# Patient Record
(demographics unavailable — no encounter records)

---

## 2024-10-08 NOTE — ASSESSMENT
[FreeTextEntry1] : Patient is a 47 yo M who presents for ED, BPH/LUTS. He has been on T supplementation, happy with his current regimen/doctor managing this.  He has regular labs and PSA testing with this other doctor.  He is here primarily for ED and to discuss LUTS.  We had a long discussion about his BPH/LUTS.  He is not interested in flomax and happy to stay on finasteride.  But he is open to MIST procedures, as he would be happy to decrease finasteride, still wants to take for his hair.  We reviewed SE profile of finasteride. D/w pt that MIST ie Urolift/Rezum has lowest risk of RE, but not zero.  D/w pt that would refer to Dr Garg if he is interested in Rezum/MIST as I do not perform. PVR today is 10cc Udip neg  In terms of ED, discussed with pt treatment options for ED, including oral PDE5-is, injectable medications such as MUSE or ICI, RINKU, and penile prosthesis. D/w pt LiSWT is considered experimental. He is open to cialis, he has tried in the past and would be open to trying again.  He has some nasal congestion w viagra, he recalls possible HA with cialis.  Reviewed w pt SE profile and risks including priapism D/w pt that may take more time for erections to recover since it could be postoperative and often takes months to improve. F/u w me for medication refills prn  Refer to Dr Garg for Rezum discussion and further evaluation of BPH

## 2024-10-08 NOTE — PHYSICAL EXAM
[General Appearance - Well Developed] : well developed [Normal Appearance] : normal appearance [Well Groomed] : well groomed [General Appearance - In No Acute Distress] : no acute distress [Edema] : no peripheral edema [Respiration, Rhythm And Depth] : normal respiratory rhythm and effort [Exaggerated Use Of Accessory Muscles For Inspiration] : no accessory muscle use [Abdomen Soft] : soft [Abdomen Tenderness] : non-tender [Urinary Bladder Findings] : the bladder was normal on palpation [Normal Station and Gait] : the gait and station were normal for the patient's age [] : no rash [No Focal Deficits] : no focal deficits [Oriented To Time, Place, And Person] : oriented to person, place, and time [Affect] : the affect was normal [Mood] : the mood was normal [Abdomen Hernia] : no hernia was discovered [Urethral Meatus] : meatus normal [Penis Abnormality] : normal circumcised penis [Scrotum] : the scrotum was normal [Testes Tenderness] : no tenderness of the testes [Testes Mass (___cm)] : there were no testicular masses [de-identified] : small anterior incision/dressing [Chaperone Present] : A chaperone was present in the examining room during all aspects of the physical examination [FreeTextEntry2] : Bacilio Atwood

## 2024-10-08 NOTE — PHYSICAL EXAM
[General Appearance - Well Developed] : well developed [Normal Appearance] : normal appearance [Well Groomed] : well groomed [General Appearance - In No Acute Distress] : no acute distress [Edema] : no peripheral edema [Respiration, Rhythm And Depth] : normal respiratory rhythm and effort [Exaggerated Use Of Accessory Muscles For Inspiration] : no accessory muscle use [Abdomen Soft] : soft [Abdomen Tenderness] : non-tender [Urinary Bladder Findings] : the bladder was normal on palpation [Normal Station and Gait] : the gait and station were normal for the patient's age [] : no rash [No Focal Deficits] : no focal deficits [Oriented To Time, Place, And Person] : oriented to person, place, and time [Affect] : the affect was normal [Mood] : the mood was normal [Abdomen Hernia] : no hernia was discovered [Urethral Meatus] : meatus normal [Penis Abnormality] : normal circumcised penis [Scrotum] : the scrotum was normal [Testes Tenderness] : no tenderness of the testes [Testes Mass (___cm)] : there were no testicular masses [de-identified] : small anterior incision/dressing [Chaperone Present] : A chaperone was present in the examining room during all aspects of the physical examination [FreeTextEntry2] : Bacilio Atwood

## 2024-10-08 NOTE — HISTORY OF PRESENT ILLNESS
[FreeTextEntry1] : Patient is a 47 yo M who presents for ED, BPH/LUTS.  He reports that he had spinal lumbar disc replacement L5-S1 surgery in 7/31/24, he had intraop catheter and postop after catheter removed he was able to void without much issue.  He did have more urgency immediately after surgery.  This has improved since.  He does take finasteride.  He has been taking finasteride for many years.  Initially for hair, then started 5mg for years.  He reports if he cuts down on finasteride or stops it then he will notice decline in LUTS.  He reports more urgency, incomplete emptying if he reduces finasteride or stops it. Denies weak stream or nocturia.  He does take prostate supplements including saw palmetto. He had tried flomax ~5 yrs ago and he had poor ejaculation after and stopped. He feels after his cystoscopy with Dr Hutchison he had issues voiding as well.  He reports that he does drink a lot of water in the mornings because he takes supplements and works out in the gym.  He drinks 40-50oz within a few hours in the morning.   He also has been on T supplementation for many years.  Managed by another doctor.  States he has PSA checked regularly 0.3 in the past year.  His main reason for presenting today is due to ED.  He reports prior to his spinal lumbar disc replacement L5-S1 surgery he had no issues with ED.  He reports strong erections.  He was taking L-arginine, citrulline, beetroot and other supplements.  He feels that rigidity is now 5/10.  He took 50 mg of viagra (from [facility]) - improved his erections but did not feel significant benefit from his regular supplements.  He has tried 6x/times, it does work but still not lasting as long as in the past.  He just feels different where at times he does not maintain or obtain as well as the past. Denies CP, nitrates.

## 2024-10-08 NOTE — HISTORY OF PRESENT ILLNESS
[FreeTextEntry1] : Patient is a 45 yo M who presents for ED, BPH/LUTS.  He reports that he had spinal lumbar disc replacement L5-S1 surgery in 7/31/24, he had intraop catheter and postop after catheter removed he was able to void without much issue.  He did have more urgency immediately after surgery.  This has improved since.  He does take finasteride.  He has been taking finasteride for many years.  Initially for hair, then started 5mg for years.  He reports if he cuts down on finasteride or stops it then he will notice decline in LUTS.  He reports more urgency, incomplete emptying if he reduces finasteride or stops it. Denies weak stream or nocturia.  He does take prostate supplements including saw palmetto. He had tried flomax ~5 yrs ago and he had poor ejaculation after and stopped. He feels after his cystoscopy with Dr Hutchison he had issues voiding as well.  He reports that he does drink a lot of water in the mornings because he takes supplements and works out in the gym.  He drinks 40-50oz within a few hours in the morning.   He also has been on T supplementation for many years.  Managed by another doctor.  States he has PSA checked regularly 0.3 in the past year.  His main reason for presenting today is due to ED.  He reports prior to his spinal lumbar disc replacement L5-S1 surgery he had no issues with ED.  He reports strong erections.  He was taking L-arginine, citrulline, beetroot and other supplements.  He feels that rigidity is now 5/10.  He took 50 mg of viagra (from [facility]) - improved his erections but did not feel significant benefit from his regular supplements.  He has tried 6x/times, it does work but still not lasting as long as in the past.  He just feels different where at times he does not maintain or obtain as well as the past. Denies CP, nitrates.

## 2024-10-21 NOTE — HISTORY OF PRESENT ILLNESS
[FreeTextEntry1] : 46-year-old male who presents as a follow-up for BPH  Main symptoms include urinary frequency, sensation of incomplete emptying.  He is on finasteride which he believe is helping.  Denies weak stream or nocturia.  Previously tried tamsulosin which caused poor ejaculation. No gross hematuria.   Cystoscopy 2019: Bilobar enlargement, obstructive bladder  Drinks mostly water. Has cut caffeine recently but used to drink one hot latte / day. Social EtOH.   He is on TRT which he gets from a friend from [place]  PVR today: 59 mL

## 2024-10-21 NOTE — ASSESSMENT
[FreeTextEntry1] : 46 y.o. M with bothersome urinary symptoms - daytime frequency, sensation of incomplete emptying - Last PSA 0.31 per patient - PVR 59 mL - RBUS to assess size of prostate, also 3mm LLP stone reported on prior abdominal US - c/w finasteride - UA, UCx - Discussed unclear if prostate is source or symptoms, versus overactive bladder, pelvic floor / dysfunctioning voiding, etc. Did recommended UDS to fully evaluate.  He reports significant discomfort with any form of catheterization, and he reports this effects his ejaculation.  He had a catheter after recent spine surgery for 1 day and reports that this "messed him up".  Did discuss that with Christal, for which he was referred, he would need a catheter for 5 days.  He is not interested in this.  He will get the ultrasound and I will call with the results [Recent Weight Loss (___ Lbs)] : recent [unfilled] ~Ulb weight loss [Negative] : Heme/Lymph

## 2024-11-06 NOTE — HISTORY OF PRESENT ILLNESS
[FreeTextEntry1] : Mr. Galeano is a 46-year-old man presenting with concerns about anxiety, dizziness, and obsessive thoughts.  Mr. Galeano reports that his symptoms started after seeing a doctor for dizziness in September 2024. Prior to this appointment he had experienced feelings of motion sickness and nausea. These symptoms were intermittent and not particularly bothersome. During this appointment, the doctor performed a maneuver, which may have provoked his symptoms. He also told him he may be experiencing PPPD and anxiety. Since then, he has been experiencing significant anxiety, panic attacks, and an obsessive focus on motion and movement. He feels that his mind has become hyperaware of various bodily sensations, leading to discomfort and distress. Mr. Galeano has a history of obsessive thoughts in the past.. He also experienced panic attacks related to tinnitus in 2010 and 2014, which he was able to manage through therapy and medication. However, the current situation has been particularly challenging and overwhelming for him.

## 2024-11-06 NOTE — END OF VISIT
Medicare Wellness Visit  Plan for Preventive Care    A good way for you to stay healthy is to use preventive care.  Medicare covers many services that can help you stay healthy.* The goal of these services is to find any health problems as quickly as possible. Finding problems early can help make them easier to treat.  Your personal plan below lists the services you may need and when they are due.     Health Maintenance Summary     Topic Due On Due Status Completed On Postpone Until Reason    Colorectal Cancer Screening - Colonoscopy Nov 20, 2017 Not Due Nov 20, 2007      Immunization-Zoster  Completed Dec 5, 2014      Immunization - Pneumococcal  Completed Jul 27, 2015      Abdominal Aortic Aneurysm (AAA) Screening   Completed Sep 3, 2015      Medicare Wellness Visit Sep 18, 2018 Not Due Sep 18, 2017      IMMUNIZATION - DTaP/Tdap/Td Jul 10, 2004 Postponed Jul 9, 2004 Sep 18, 2017 Insurance or Financial    Immunization-Influenza Sep 1, 2017 Due On              Preventive Care for Women and Men    Heart Screenings (Cardiovascular):  · Blood tests are used to check your cholesterol, lipid and triglyceride levels. High levels can increase your risk for heart disease and stroke. High levels can be treated with medications, diet and exercise. Lowering your levels can help keep your heart and blood vessels healthy.  Your provider will order these tests if they are needed.    · An ultrasound is done to see if you have an abdominal aortic aneurysm (AAA).  This is an enlargement of one of the main blood vessels that delivers blood to the body.   In the [place], 9,000 deaths are caused by AAA.  You may not even know you have this problem and as many as 1 in 3 people will have a serious problem if it is not treated.  Early diagnosis allows for more effective treatment and cure.  If you have a family history of AAA or are a male age 65-75 who has smoked, you are at higher risk of an AAA.  Your provider can order this  [Time Spent: ___ minutes] : I have spent [unfilled] minutes of time on the encounter which excludes teaching and separately reported services. test, if needed.    Colorectal Screening:  · There are many tests that are used to check for cancer of your colon and rectum. You and your provider should discuss what test is best for you and when to have it done.  Options include:  · Screening Colonoscopy: exam of the entire colon, seen through a flexible lighted tube.  · Flexible Sigmoidoscopy: exam of the last third (sigmoid portion) of the colon and rectum, seen through a flexible lighted tube.  · Cologuard DNA stool test: a sample of your stool is used to screen for cancer and unseen blood in your stool.  · Fecal Occult Blood Test: a sample of your stool is studied to find any unseen blood    Flu Shot:  · An immunization that helps to prevent influenza (the flu). You should get this every year. The best time to get the shot is in the fall.    Pneumococcal Shot:  • Vaccines are available that can help prevent pneumococcal disease, which is any type of infection caused by Streptococcus pneumoniae bacteria.   Their use can prevent some cases of pneumonia, meningitis, and sepsis. There are two types of pneumococcal vaccines:   o Conjugate vaccines (PCV-13 or Prevnar 13®) - helps protect against the 13 types of pneumococcal bacteria that are the most common causes of serious infections in children and adults.    o Polysaccharide vaccine (PPSV23 or Eohajqeua72®) - helps protect against 23 types of pneumococcal bacteria for patients who are recommended to get it.  These vaccines should be given at least 12 months apart.  A booster is usually not needed.     Hepatitis B Shot:  · An immunization that helps to protect people from getting Hepatitis B. Hepatitis B is a virus that spreads through contact with infected blood or body fluids. Many people with the virus do not have symptoms.  The virus can lead to serious problems, such as liver disease. Some people are at higher risk than others. Your doctor will tell you if you need this shot.     Diabetes Screening:  · A  test to measure sugar (glucose) in your blood is called a fasting blood sugar. Fasting means you cannot have food or drink for at least 8 hours before the test. This test can detect diabetes long before you may notice symptoms.    Glaucoma Screening:  · Glaucoma screening is performed by your eye doctor. The test measures the fluid pressure inside your eyes to determine if you have glaucoma.     Hepatitis C Screening:  · A blood test to see if you have the hepatitis C virus.  Hepatitis C attacks the liver and is a major cause of chronic liver disease.  Medicare will cover a single screening for all adults born between 1945 & 1965, or high risk patients (people who have injected illegal drugs or people who have had blood transfusions).  High risk patients who continue to inject illegal drugs can be screened for Hepatitis C every year.    Smoking and Tobacco-Use Cessation Counseling:  · Tobacco is the single greatest cause of disease and early death in our country today. Medication and counseling together can increase a person’s chance of quitting for good.   · Medicare covers two quitting attempts per year, with four counseling sessions per attempt (eight sessions in a 12 month period)    Preventive Screening tests for Women    Screening Mammograms and Breast Exams:  · An x-ray of your breasts to check for breast cancer before you or your doctor may be able to feel it.  If breast cancer is found early it can usually be treated with success.    Pelvic Exams and Pap Tests:  · An exam to check for cervical and vaginal cancer. A Pap test is a lab test in which cells are taken from your cervix and sent to the lab to look for signs of cervical cancer. If cancer of the cervix is found early, chances for a cure are good. Testing can generally end at age 65, or if a woman has a hysterectomy for a benign condition. Your provider may recommend more frequent testing if certain abnormal results are found.    Bone Mass  Measurements:  · A painless x-ray of your bone density to see if you are at risk for a broken bone. Bone density refers to the thickness of bones or how tightly the bone tissue is packed.    Preventive Screening tests for Men    Prostate Screening:  · PSA - Prostate Cancer blood test.  Experts do not recommend routine screening of healthy men with no signs or symptoms of prostate disease.  However, men should not ignore urinary symptoms, and should discuss their family history with their doctor.    *Medicare pays for many preventive services to keep you healthy. For some of these services, you might have to pay a deductible, coinsurance, and / or copayment.  The amounts vary depending on the type of services you need and the kind of Medicare health plan you have.

## 2024-11-06 NOTE — DISCUSSION/SUMMARY
[FreeTextEntry1] : Based on the history and presentation, Mr. Galeano appears to be experiencing significant anxiety, panic attacks, and obsessive thoughts, which may be related to a combination of factors, including a potential triggering event during his medical visit, underlying predisposition to anxiety and obsessive thoughts, and a possible component of health anxiety or illness anxiety disorder. Continue Lexapro 15 mg daily and reassess in 4-6 weeks for therapeutic response. Consider increasing the dose if needed, up to 20 mg daily. Discuss the option of adding propranolol as needed for anxiety symptoms. Will order MRI brain to rule out any structural abnormalities, although the likelihood of finding a significant abnormality is low based onhis normal neurological examination. Refer to a cognitive behavioral therapist specializing in exposure therapy and anxiety disorders for psychotherapy. Patient educated on the nature of anxiety disorders, the role of medication and therapy, and the importance of lifestyle modifications such as exercise, stress management, and relaxation techniques. Schedule a follow-up appointment in 6-8 weeks to reassess symptoms and treatment response.

## 2024-11-06 NOTE — HISTORY OF PRESENT ILLNESS
[FreeTextEntry1] : Mr. Galenao is a 46-year-old man presenting with concerns about anxiety, dizziness, and obsessive thoughts.  Mr. Galeano reports that his symptoms started after seeing a doctor for dizziness in September 2024. Prior to this appointment he had experienced feelings of motion sickness and nausea. These symptoms were intermittent and not particularly bothersome. During this appointment, the doctor performed a maneuver, which may have provoked his symptoms. He also told him he may be experiencing PPPD and anxiety. Since then, he has been experiencing significant anxiety, panic attacks, and an obsessive focus on motion and movement. He feels that his mind has become hyperaware of various bodily sensations, leading to discomfort and distress. Mr. Galeano has a history of obsessive thoughts in the past.. He also experienced panic attacks related to tinnitus in 2010 and 2014, which he was able to manage through therapy and medication. However, the current situation has been particularly challenging and overwhelming for him.

## 2024-12-04 NOTE — PHYSICAL EXAM
[Alert] : alert [Normal Voice/Communication] : normal voice/communication [Healthy Appearing] : healthy appearing [No Acute Distress] : no acute distress [Sclera] : the sclera and conjunctiva were normal [Hearing Threshold Finger Rub Not [place]] : hearing was normal [Normal Lips/Gums] : the lips and gums were normal [Oropharynx] : the oropharynx was normal [Normal Appearance] : the appearance of the neck was normal [No Neck Mass] : no neck mass was observed [No Respiratory Distress] : no respiratory distress [No Acc Muscle Use] : no accessory muscle use [Respiration, Rhythm And Depth] : normal respiratory rhythm and effort [Auscultation Breath Sounds / Voice Sounds] : lungs were clear to auscultation bilaterally [Heart Rate And Rhythm] : heart rate was normal and rhythm regular [Normal S1, S2] : normal S1 and S2 [Murmurs] : no murmurs [Bowel Sounds] : normal bowel sounds [Abdomen Tenderness] : non-tender [No Masses] : no abdominal mass palpated [] : no hepatosplenomegaly [Abdomen Soft] : soft [Oriented To Time, Place, And Person] : oriented to person, place, and time

## 2024-12-04 NOTE — HISTORY OF PRESENT ILLNESS
[FreeTextEntry1] : SHAUN MEJIA is a 46 year old male with a history of hypothyroidism, bloating, EPI  He is presenting today  for followup  He was treated for a positive candida immune complex test (3.87  elevated - upper limit of normal is 0.9) -initially treated with undecyelnic acid to break up the biofilm, then nystatin oral liquid - 15ml/day x 3 months - levels did not decrease significantly - felt less bloated/ early satiety improved slightly  - two months of fluconazole 100mg once a day x 2 months - levels dropped  - then started nystatin  Bloating still bothering him Starting Creon recently, helped in the beginning but not so much now Takes at varied time throughout the meal  Wondering about restarting fluconazole Wants to move forward with EUS to eval pancreas  Also has some dysphagia/globus - concerned about reflux (or low acid state per naturopathic doctor)

## 2025-03-14 NOTE — PHYSICAL EXAM
[de-identified] : Constitutional: Well-nourished, well-developed, No acute distress Respiratory:  Good respiratory effort, no SOB Lymphatic: No regional lymphadenopathy, no lymphedema Psychiatric: Pleasant and normal affect, alert and oriented x3 Skin: Clean dry and intact B/L LE Musculoskeletal: normal except where as noted in regional exam  Left Knee: APPEARANCE: no marked deformities, no swelling or malalignment POSITIVE TENDERNESS:  + crepitus of the anterior knee, and tenderness of patellar retinaculum NONTENDER: jt lines b/l, patellar & quadriceps tendons, MCL/LCL, ITB at the lateral femoral condyle & Gerdy's tubercle, pes bursa.  ROM: full & painless, although some discomfort in deep knee flexion RESISTIVE TESTING: + discomfort with knee ext from deep knee flexion (stretched position), painless knee flexion.  SPECIAL TESTS: stable v/v stress. painless grind. neg Lachman's. neg ant/post drawer. neg Katharina's.   Right Knee: APPEARANCE: no marked deformities, no swelling or malalignment POSITIVE TENDERNESS:  + crepitus of the anterior knee, and tenderness of patellar retinaculum NONTENDER: jt lines b/l, patellar & quadriceps tendons, MCL/LCL, ITB at the lateral femoral condyle & Gerdy's tubercle, pes bursa.  ROM: full & painless, although some discomfort in deep knee flexion RESISTIVE TESTING: + discomfort with knee ext from deep knee flexion (stretched position), painless knee flexion.  SPECIAL TESTS: stable v/v stress. painless grind. neg Lachman's. neg ant/post drawer. neg Katharina's.

## 2025-03-14 NOTE — HISTORY OF PRESENT ILLNESS
[de-identified] : Patient has a history of knee osteoarthritis.  He had durolane injections for both knees 9/2024 with good relief. Pain has been gradually returning limiting the distance he can walk.

## 2025-03-14 NOTE — REASON FOR VISIT
[Follow-Up Visit] : a follow-up visit for [Knee Pain] : knee pain [FreeTextEntry2] : bilateral knee OA

## 2025-03-14 NOTE — PHYSICAL EXAM
[de-identified] : Constitutional: Well-nourished, well-developed, No acute distress Respiratory:  Good respiratory effort, no SOB Lymphatic: No regional lymphadenopathy, no lymphedema Psychiatric: Pleasant and normal affect, alert and oriented x3 Skin: Clean dry and intact B/L LE Musculoskeletal: normal except where as noted in regional exam  Left Knee: APPEARANCE: no marked deformities, no swelling or malalignment POSITIVE TENDERNESS:  + crepitus of the anterior knee, and tenderness of patellar retinaculum NONTENDER: jt lines b/l, patellar & quadriceps tendons, MCL/LCL, ITB at the lateral femoral condyle & Gerdy's tubercle, pes bursa.  ROM: full & painless, although some discomfort in deep knee flexion RESISTIVE TESTING: + discomfort with knee ext from deep knee flexion (stretched position), painless knee flexion.  SPECIAL TESTS: stable v/v stress. painless grind. neg Lachman's. neg ant/post drawer. neg Katharina's.   Right Knee: APPEARANCE: no marked deformities, no swelling or malalignment POSITIVE TENDERNESS:  + crepitus of the anterior knee, and tenderness of patellar retinaculum NONTENDER: jt lines b/l, patellar & quadriceps tendons, MCL/LCL, ITB at the lateral femoral condyle & Gerdy's tubercle, pes bursa.  ROM: full & painless, although some discomfort in deep knee flexion RESISTIVE TESTING: + discomfort with knee ext from deep knee flexion (stretched position), painless knee flexion.  SPECIAL TESTS: stable v/v stress. painless grind. neg Lachman's. neg ant/post drawer. neg Katharina's.

## 2025-03-14 NOTE — HISTORY OF PRESENT ILLNESS
[de-identified] : Patient has a history of knee osteoarthritis.  He had durolane injections for both knees 9/2024 with good relief. Pain has been gradually returning limiting the distance he can walk.

## 2025-03-14 NOTE — DISCUSSION/SUMMARY
[de-identified] : Patient was seen today for evaluation and management of chronic intermittent bilateral knee pain with recent atraumatic exacerbation due to underlying osteoarthritis.  Patient had very good relief from HA injection provided previously, he would like to repeat this injection series.  We discussed various treatment options as well as associated risk/benefits/alternatives and patient elected to proceed with insurance authorization for hyaluronic acid injection therapy as most potential long-term preventative treatment option (we discussed potential side effects including but not limited to bleeding, infection, or postinjection flareup of arthritis pain).  Patient will follow-up in the office for injection therapy once approved by insurance.  Patient appreciates and agrees with current plan.  This note was generated using dragon medical dictation software.  A reasonable effort has been made for proofreading its contents, but typos may still remain.  If there are any questions or points of clarification needed please notify my office.

## 2025-03-14 NOTE — DISCUSSION/SUMMARY
[de-identified] : Patient was seen today for evaluation and management of chronic intermittent bilateral knee pain with recent atraumatic exacerbation due to underlying osteoarthritis.  Patient had very good relief from HA injection provided previously, he would like to repeat this injection series.  We discussed various treatment options as well as associated risk/benefits/alternatives and patient elected to proceed with insurance authorization for hyaluronic acid injection therapy as most potential long-term preventative treatment option (we discussed potential side effects including but not limited to bleeding, infection, or postinjection flareup of arthritis pain).  Patient will follow-up in the office for injection therapy once approved by insurance.  Patient appreciates and agrees with current plan.  This note was generated using dragon medical dictation software.  A reasonable effort has been made for proofreading its contents, but typos may still remain.  If there are any questions or points of clarification needed please notify my office.

## 2025-04-28 NOTE — PHYSICAL EXAM
[de-identified] : Constitutional: Well-nourished, well-developed, No acute distress Respiratory:  Good respiratory effort, no SOB Lymphatic: No regional lymphadenopathy, no lymphedema Psychiatric: Pleasant and normal affect, alert and oriented x3 Skin: Clean dry and intact B/L LE Musculoskeletal: normal except where as noted in regional exam  Left Knee: APPEARANCE: no marked deformities, no swelling or malalignment POSITIVE TENDERNESS:  + crepitus of the anterior knee, and tenderness of patellar retinaculum NONTENDER: jt lines b/l, patellar & quadriceps tendons, MCL/LCL, ITB at the lateral femoral condyle & Gerdy's tubercle, pes bursa.  ROM: full & painless, although some discomfort in deep knee flexion RESISTIVE TESTING: + discomfort with knee ext from deep knee flexion (stretched position), painless knee flexion.  SPECIAL TESTS: stable v/v stress. painless grind. neg Lachman's. neg ant/post drawer. neg Katharina's.   Right Knee: APPEARANCE: no marked deformities, no swelling or malalignment POSITIVE TENDERNESS:  + crepitus of the anterior knee, and tenderness of patellar retinaculum NONTENDER: jt lines b/l, patellar & quadriceps tendons, MCL/LCL, ITB at the lateral femoral condyle & Gerdy's tubercle, pes bursa.  ROM: full & painless, although some discomfort in deep knee flexion RESISTIVE TESTING: + discomfort with knee ext from deep knee flexion (stretched position), painless knee flexion.  SPECIAL TESTS: stable v/v stress. painless grind. neg Lachman's. neg ant/post drawer. neg Katharina's.   Right shoulder: APPEARANCE: no marked deformities, no swelling or malalignment POSITIVE TENDERNESS: supraspinatus NONTENDER:  infraspinatus, teres minor. biceps. anterior and posterior capsule. AC joint.  ROM: full with mild painful arc past 60 degrees, no scapular winging or dyskinesia present RESISTIVE TESTING: MMT 4+/5 ER and empty can, 5/5 IR. painless 5/5 resisted flex/ext, horizontal abd/add  SPECIAL TESTS: + Zee and Neers, mildly + cross arm adduction, neg Speeds, neg Wu's, neg Drop Arm, neg Apprehension. neg apley's scratch test  [de-identified] :  The following radiographs were ordered and read by me during this patient's visit. I reviewed each radiograph in detail with the patient and discussed the findings as highlighted below.   3 views of the right shoulder were obtained today that show no fracture, or dislocation. There are no degenerative changes seen. There is no malalignment. No obvious osseous abnormality. Otherwise unremarkable.

## 2025-04-28 NOTE — PROCEDURE
[de-identified] : Injection: Right knee joint. Indication: Osteoarthritis.  A discussion was had with the patient regarding this procedure and all questions were answered. All risks, benefits and alternatives were discussed. These included but were not limited to bleeding, infection, and allergic reaction. A timeout was done to ensure correct side and patient agreed to the procedure.  A [place] seamus was created on the skin utilizing a plastic needle cap to seamus the anticipated point of entry. Alcohol was used to clean the skin, and Betadine was used to sterilize and prep the area in the lateral joint line aspect of the knee. Ethyl chloride spray was then used as a topical anesthetic. A 22-gauge needle was used to inject 2 cc of Visco-3 into the knee with ease. A sterile bandage was then applied. The patient tolerated the procedure well and there were no complications.   Lot #:  [id number] Exp:  1/31/28  _____________________________________  Injection: Left knee joint. Indication: Osteoarthritis.  A discussion was had with the patient regarding this procedure and all questions were answered. All risks, benefits and alternatives were discussed. These included but were not limited to bleeding, infection, and allergic reaction. A timeout was done to ensure correct side and patient agreed to the procedure.  A [place] seamus was created on the skin utilizing a plastic needle cap to seamus the anticipated point of entry. Alcohol was used to clean the skin, and Betadine was used to sterilize and prep the area in the lateral joint line aspect of the knee. Ethyl chloride spray was then used as a topical anesthetic. A 22-gauge needle was used to inject 2 cc of Visco-3 into the knee with ease. A sterile bandage was then applied. The patient tolerated the procedure well and there were no complications.   Lot #:  [id number] Exp:  1/31/28  ______________ Injection: Right shoulder Subacromial Space. Indication: Impingement.  A discussion was had with the patient regarding this procedure and all questions were answered. All risks, benefits and alternatives were discussed. These included but were not limited to bleeding, infection, and allergic reaction.  A timeout was done to ensure correct side and pt agreed to the procedure.   Alcohol was used to clean the skin, and betadine was used to sterilize and prep the area in the posterior aspect of the shoulder. Ethyl chloride spray was then used as a topical anesthetic. A 22-gauge 1.5" needle was used to inject 2cc of 0.25% bupivacaine and 1cc of 40mg/ml methylprednisolone into the subacromial space. A sterile bandage was then applied. The patient tolerated the procedure well and there were no complications.

## 2025-04-28 NOTE — PROCEDURE
[de-identified] : Injection: Right knee joint. Indication: Osteoarthritis.  A discussion was had with the patient regarding this procedure and all questions were answered. All risks, benefits and alternatives were discussed. These included but were not limited to bleeding, infection, and allergic reaction. A timeout was done to ensure correct side and patient agreed to the procedure.  A [place] seamus was created on the skin utilizing a plastic needle cap to seamus the anticipated point of entry. Alcohol was used to clean the skin, and Betadine was used to sterilize and prep the area in the lateral joint line aspect of the knee. Ethyl chloride spray was then used as a topical anesthetic. A 22-gauge needle was used to inject 2 cc of Visco-3 into the knee with ease. A sterile bandage was then applied. The patient tolerated the procedure well and there were no complications.   Lot #:  [id number] Exp:  1/31/28  _____________________________________  Injection: Left knee joint. Indication: Osteoarthritis.  A discussion was had with the patient regarding this procedure and all questions were answered. All risks, benefits and alternatives were discussed. These included but were not limited to bleeding, infection, and allergic reaction. A timeout was done to ensure correct side and patient agreed to the procedure.  A [place] seamus was created on the skin utilizing a plastic needle cap to seamus the anticipated point of entry. Alcohol was used to clean the skin, and Betadine was used to sterilize and prep the area in the lateral joint line aspect of the knee. Ethyl chloride spray was then used as a topical anesthetic. A 22-gauge needle was used to inject 2 cc of Visco-3 into the knee with ease. A sterile bandage was then applied. The patient tolerated the procedure well and there were no complications.   Lot #:  [id number] Exp:  1/31/28  ______________ Injection: Right shoulder Subacromial Space. Indication: Impingement.  A discussion was had with the patient regarding this procedure and all questions were answered. All risks, benefits and alternatives were discussed. These included but were not limited to bleeding, infection, and allergic reaction.  A timeout was done to ensure correct side and pt agreed to the procedure.   Alcohol was used to clean the skin, and betadine was used to sterilize and prep the area in the posterior aspect of the shoulder. Ethyl chloride spray was then used as a topical anesthetic. A 22-gauge 1.5" needle was used to inject 2cc of 0.25% bupivacaine and 1cc of 40mg/ml methylprednisolone into the subacromial space. A sterile bandage was then applied. The patient tolerated the procedure well and there were no complications.

## 2025-04-28 NOTE — HISTORY OF PRESENT ILLNESS
[de-identified] : Patient with B/L knee osteoarthritis. He had durolane injections for both knees 9/2024 with good relief. Pain has been gradually returning limiting the distance he can walk Pt states he also has chronic right shoulder pain that started after weightlifting. He saw Dr Marinelli and he had an injection in August 2023 and on 12/2023 which had helped.

## 2025-04-28 NOTE — DISCUSSION/SUMMARY
[de-identified] : Discussed findings of today's exam and possible causes of patient's pain.  Educated patient on their most probable diagnosis of chronic intermittent right shoulder pain with recent atraumatic exacerbation due to subacromial impingement.  Reviewed possible courses of treatment, and we collaboratively decided best course of treatment at this time will include conservative management.  We discussed various treatment options as well as associated risk/benefits/alternatives and patient elected to proceed with right subacromial cortisone injection today (see procedure note).  Informed the patient that the numbing medicine in today's injection will last for about 4-6 hours. The steroid that was injected will start to work in 1 to 2 days, peak at 1-2 weeks, and may last up to 1-2 months.  Patient inquired if he would be a candidate for hyaluronic acid injection for his shoulder, he is advised that his shoulder pain is due to tendinitis, not glenohumeral osteoarthritis.  HA injections are to address joint arthritis, he is not a candidate for HA injections for his shoulder pain. Patient was seen today for continued management of chronic knee pain with recent atraumatic exacerbation secondary to underlying osteoarthritis.  We tried to obtain insurance authorization for hyaluronic acid injection therapies for his bilateral knees but this was denied by his insurance company as not a covered benefit.  We discussed various treatment options as well as associated risk/benefits/alternatives and patient elected to proceed with cash pay hyaluronic acid injection therapy. The first of three Visco 3 injections was given today under sterile conditions into the Bilateral knee joints without complication (see procedure note). The patient was instructed on modification of activities over the next 48-72 hours. I advised the patient to ice the knee as needed for control of local irritation from the injection. I advised that some patients have immediate benefit from the initial injection therapy, however it usually takes the medication a number of weeks (~8wks) to provide significant relief of osteoarthritic symptoms.   I  will see the patient back for the second injection in approximately 1 week.  This note was generated using dragon medical dictation software.  A reasonable effort has been made for proofreading its contents, but typos may still remain.  If there are any questions or points of clarification needed please notify my office.

## 2025-04-28 NOTE — DISCUSSION/SUMMARY
[de-identified] : Discussed findings of today's exam and possible causes of patient's pain.  Educated patient on their most probable diagnosis of chronic intermittent right shoulder pain with recent atraumatic exacerbation due to subacromial impingement.  Reviewed possible courses of treatment, and we collaboratively decided best course of treatment at this time will include conservative management.  We discussed various treatment options as well as associated risk/benefits/alternatives and patient elected to proceed with right subacromial cortisone injection today (see procedure note).  Informed the patient that the numbing medicine in today's injection will last for about 4-6 hours. The steroid that was injected will start to work in 1 to 2 days, peak at 1-2 weeks, and may last up to 1-2 months.  Patient inquired if he would be a candidate for hyaluronic acid injection for his shoulder, he is advised that his shoulder pain is due to tendinitis, not glenohumeral osteoarthritis.  HA injections are to address joint arthritis, he is not a candidate for HA injections for his shoulder pain. Patient was seen today for continued management of chronic knee pain with recent atraumatic exacerbation secondary to underlying osteoarthritis.  We tried to obtain insurance authorization for hyaluronic acid injection therapies for his bilateral knees but this was denied by his insurance company as not a covered benefit.  We discussed various treatment options as well as associated risk/benefits/alternatives and patient elected to proceed with cash pay hyaluronic acid injection therapy. The first of three Visco 3 injections was given today under sterile conditions into the Bilateral knee joints without complication (see procedure note). The patient was instructed on modification of activities over the next 48-72 hours. I advised the patient to ice the knee as needed for control of local irritation from the injection. I advised that some patients have immediate benefit from the initial injection therapy, however it usually takes the medication a number of weeks (~8wks) to provide significant relief of osteoarthritic symptoms.   I  will see the patient back for the second injection in approximately 1 week.  This note was generated using dragon medical dictation software.  A reasonable effort has been made for proofreading its contents, but typos may still remain.  If there are any questions or points of clarification needed please notify my office.

## 2025-04-28 NOTE — PHYSICAL EXAM
[de-identified] : Constitutional: Well-nourished, well-developed, No acute distress Respiratory:  Good respiratory effort, no SOB Lymphatic: No regional lymphadenopathy, no lymphedema Psychiatric: Pleasant and normal affect, alert and oriented x3 Skin: Clean dry and intact B/L LE Musculoskeletal: normal except where as noted in regional exam  Left Knee: APPEARANCE: no marked deformities, no swelling or malalignment POSITIVE TENDERNESS:  + crepitus of the anterior knee, and tenderness of patellar retinaculum NONTENDER: jt lines b/l, patellar & quadriceps tendons, MCL/LCL, ITB at the lateral femoral condyle & Gerdy's tubercle, pes bursa.  ROM: full & painless, although some discomfort in deep knee flexion RESISTIVE TESTING: + discomfort with knee ext from deep knee flexion (stretched position), painless knee flexion.  SPECIAL TESTS: stable v/v stress. painless grind. neg Lachman's. neg ant/post drawer. neg Katharina's.   Right Knee: APPEARANCE: no marked deformities, no swelling or malalignment POSITIVE TENDERNESS:  + crepitus of the anterior knee, and tenderness of patellar retinaculum NONTENDER: jt lines b/l, patellar & quadriceps tendons, MCL/LCL, ITB at the lateral femoral condyle & Gerdy's tubercle, pes bursa.  ROM: full & painless, although some discomfort in deep knee flexion RESISTIVE TESTING: + discomfort with knee ext from deep knee flexion (stretched position), painless knee flexion.  SPECIAL TESTS: stable v/v stress. painless grind. neg Lachman's. neg ant/post drawer. neg Katharina's.   Right shoulder: APPEARANCE: no marked deformities, no swelling or malalignment POSITIVE TENDERNESS: supraspinatus NONTENDER:  infraspinatus, teres minor. biceps. anterior and posterior capsule. AC joint.  ROM: full with mild painful arc past 60 degrees, no scapular winging or dyskinesia present RESISTIVE TESTING: MMT 4+/5 ER and empty can, 5/5 IR. painless 5/5 resisted flex/ext, horizontal abd/add  SPECIAL TESTS: + Zee and Neers, mildly + cross arm adduction, neg Speeds, neg Wu's, neg Drop Arm, neg Apprehension. neg apley's scratch test  [de-identified] :  The following radiographs were ordered and read by me during this patient's visit. I reviewed each radiograph in detail with the patient and discussed the findings as highlighted below.   3 views of the right shoulder were obtained today that show no fracture, or dislocation. There are no degenerative changes seen. There is no malalignment. No obvious osseous abnormality. Otherwise unremarkable.

## 2025-04-28 NOTE — HISTORY OF PRESENT ILLNESS
[de-identified] : Patient with B/L knee osteoarthritis. He had durolane injections for both knees 9/2024 with good relief. Pain has been gradually returning limiting the distance he can walk Pt states he also has chronic right shoulder pain that started after weightlifting. He saw Dr Marinelli and he had an injection in August 2023 and on 12/2023 which had helped.

## 2025-05-08 NOTE — PROCEDURE
[de-identified] : Injection: Right knee joint. Indication: Osteoarthritis.  A discussion was had with the patient regarding this procedure and all questions were answered. All risks, benefits and alternatives were discussed. These included but were not limited to bleeding, infection, and allergic reaction. A timeout was done to ensure correct side and patient agreed to the procedure.  A [place] seamus was created on the skin utilizing a plastic needle cap to seamus the anticipated point of entry. Alcohol was used to clean the skin, and Betadine was used to sterilize and prep the area in the lateral joint line aspect of the knee. Ethyl chloride spray was then used as a topical anesthetic. A 22-gauge needle was used to inject 4 cc of Visco-3 into the knee with ease. A sterile bandage was then applied. The patient tolerated the procedure well and there were no complications.   Lot #:  [id number] Exp:  1/31/28  _____________________________________  Injection: Left knee joint. Indication: Osteoarthritis.  A discussion was had with the patient regarding this procedure and all questions were answered. All risks, benefits and alternatives were discussed. These included but were not limited to bleeding, infection, and allergic reaction. A timeout was done to ensure correct side and patient agreed to the procedure.  A [place] seamus was created on the skin utilizing a plastic needle cap to seamsu the anticipated point of entry. Alcohol was used to clean the skin, and Betadine was used to sterilize and prep the area in the lateral joint line aspect of the knee. Ethyl chloride spray was then used as a topical anesthetic. A 22-gauge needle was used to inject 4 cc of Visco-3 into the knee with ease. A sterile bandage was then applied. The patient tolerated the procedure well and there were no complications.   Lot #:  [id number] Exp:  1/31/28

## 2025-05-08 NOTE — PHYSICAL EXAM
[de-identified] : Constitutional: Well-nourished, well-developed, No acute distress Respiratory:  Good respiratory effort, no SOB Lymphatic: No regional lymphadenopathy, no lymphedema Psychiatric: Pleasant and normal affect, alert and oriented x3 Skin: Clean dry and intact B/L LE Musculoskeletal: normal except where as noted in regional exam  Left Knee: APPEARANCE: no marked deformities, no swelling or malalignment POSITIVE TENDERNESS:  + crepitus of the anterior knee, and tenderness of patellar retinaculum NONTENDER: jt lines b/l, patellar & quadriceps tendons, MCL/LCL, ITB at the lateral femoral condyle & Gerdy's tubercle, pes bursa.  ROM: full & painless, although some discomfort in deep knee flexion RESISTIVE TESTING: + discomfort with knee ext from deep knee flexion (stretched position), painless knee flexion.  SPECIAL TESTS: stable v/v stress. painless grind. neg Lachman's. neg ant/post drawer. neg Katharina's.   Right Knee: APPEARANCE: no marked deformities, no swelling or malalignment POSITIVE TENDERNESS:  + crepitus of the anterior knee, and tenderness of patellar retinaculum NONTENDER: jt lines b/l, patellar & quadriceps tendons, MCL/LCL, ITB at the lateral femoral condyle & Gerdy's tubercle, pes bursa.  ROM: full & painless, although some discomfort in deep knee flexion RESISTIVE TESTING: + discomfort with knee ext from deep knee flexion (stretched position), painless knee flexion.  SPECIAL TESTS: stable v/v stress. painless grind. neg Lachman's. neg ant/post drawer. neg Katharina's.

## 2025-05-08 NOTE — DISCUSSION/SUMMARY
[de-identified] : The second and third Visoc-3 injections were given today under sterile conditions into the bilateral knee joints without complication (see procedure note). I discussed the effects of this medication and how long it may provide benefit. Patient has obtained moderate immediate improvement. If no significant long-term benefit, the patient may elect for additional treatment strategies as previously discussed. However, if the patient obtains good relief of symptoms, the injection therapy series can be repeated over the next 6-12 months.   Will have the patient followup on an as-needed basis at this time.  Patient appreciates and agrees with current plan.   This note was generated using dragon medical dictation software.  A reasonable effort has been made for proofreading its contents, but typos may still remain.  If there are any questions or points of clarification needed please notify my office.

## 2025-05-08 NOTE — HISTORY OF PRESENT ILLNESS
[de-identified] : Patient with B/L knee osteoarthritis. He had durolane injections for both knees 9/2024 with good relief. Pt s/p  1st Visco injections was given on 04/25/2025, He is here for 2ns injection

## 2025-05-14 NOTE — ASSESSMENT
[FreeTextEntry1] :  SHAUN MEJIA is a 47 year old male with a history of  hypothyroidism, bloating, EPI  #EPI # Abdominal bloating/early satiety # SIBO/ increased intestinal candida levels  Recs: - start pepcid 40mg PO at night - ordered - if no improvement after 1 week let me know and can set up for Vergara with Dr. White or motility team - refilled creon - 2 pills with meals - followup in ~6 months or sooner if needed

## 2025-05-14 NOTE — HISTORY OF PRESENT ILLNESS
[FreeTextEntry1] :  SHAUN MEJIA is a 47 year old male with a history of  hypothyroidism, bloating, EPI  He is presenting today for followup Last visit was for EGD/EUS - normal EGD and normal pancreas  FE-1 was 115 - mild EPI - stable  Taking: fluconazole caprilic acid  Biocidin- "Remove" -  1 liquid gel going up to 2 capsules [employer] probiotic - soil based probiotic - going to try this   Also taking Creon 2 tabs with meals - not sure if helping  Sensation of foreign body in throat sometimes - in the past had this w visible candida on endosocpy and was given PPI/H2 blocker which helped  Going back to naturopath this summer to discuss candida levels /treatment options